# Patient Record
Sex: FEMALE | Race: WHITE
[De-identification: names, ages, dates, MRNs, and addresses within clinical notes are randomized per-mention and may not be internally consistent; named-entity substitution may affect disease eponyms.]

---

## 2019-07-01 ENCOUNTER — HOSPITAL ENCOUNTER (EMERGENCY)
Dept: HOSPITAL 47 - EC | Age: 49
LOS: 1 days | Discharge: PSYCHIATRIC OR SUBSTANCE USE FACILITY | End: 2019-07-02
Payer: COMMERCIAL

## 2019-07-01 VITALS — TEMPERATURE: 98.5 F

## 2019-07-01 DIAGNOSIS — F32.9: Primary | ICD-10-CM

## 2019-07-01 DIAGNOSIS — F17.200: ICD-10-CM

## 2019-07-01 DIAGNOSIS — Z91.048: ICD-10-CM

## 2019-07-01 DIAGNOSIS — R45.851: ICD-10-CM

## 2019-07-01 DIAGNOSIS — Z79.899: ICD-10-CM

## 2019-07-01 DIAGNOSIS — E11.9: ICD-10-CM

## 2019-07-01 DIAGNOSIS — Z79.84: ICD-10-CM

## 2019-07-01 DIAGNOSIS — Z88.2: ICD-10-CM

## 2019-07-01 DIAGNOSIS — F41.9: ICD-10-CM

## 2019-07-01 LAB
ALBUMIN SERPL-MCNC: 4 G/DL (ref 3.5–5)
ALP SERPL-CCNC: 80 U/L (ref 38–126)
ALT SERPL-CCNC: 13 U/L (ref 9–52)
ANION GAP SERPL CALC-SCNC: 5 MMOL/L
AST SERPL-CCNC: 17 U/L (ref 14–36)
BASOPHILS # BLD AUTO: 0.1 K/UL (ref 0–0.2)
BASOPHILS NFR BLD AUTO: 1 %
BUN SERPL-SCNC: 7 MG/DL (ref 7–17)
CALCIUM SPEC-MCNC: 8.9 MG/DL (ref 8.4–10.2)
CHLORIDE SERPL-SCNC: 104 MMOL/L (ref 98–107)
CO2 SERPL-SCNC: 28 MMOL/L (ref 22–30)
EOSINOPHIL # BLD AUTO: 0.3 K/UL (ref 0–0.7)
EOSINOPHIL NFR BLD AUTO: 4 %
ERYTHROCYTE [DISTWIDTH] IN BLOOD BY AUTOMATED COUNT: 4.52 M/UL (ref 3.8–5.4)
ERYTHROCYTE [DISTWIDTH] IN BLOOD: 14.7 % (ref 11.5–15.5)
GLUCOSE SERPL-MCNC: 67 MG/DL (ref 74–99)
HCT VFR BLD AUTO: 42 % (ref 34–46)
HGB BLD-MCNC: 14.1 GM/DL (ref 11.4–16)
LYMPHOCYTES # SPEC AUTO: 2.3 K/UL (ref 1–4.8)
LYMPHOCYTES NFR SPEC AUTO: 39 %
MCH RBC QN AUTO: 31.3 PG (ref 25–35)
MCHC RBC AUTO-ENTMCNC: 33.7 G/DL (ref 31–37)
MCV RBC AUTO: 92.9 FL (ref 80–100)
MONOCYTES # BLD AUTO: 0.3 K/UL (ref 0–1)
MONOCYTES NFR BLD AUTO: 5 %
NEUTROPHILS # BLD AUTO: 3 K/UL (ref 1.3–7.7)
NEUTROPHILS NFR BLD AUTO: 50 %
PH UR: 6 [PH] (ref 5–8)
PLATELET # BLD AUTO: 253 K/UL (ref 150–450)
POTASSIUM SERPL-SCNC: 4.3 MMOL/L (ref 3.5–5.1)
PROT SERPL-MCNC: 6.9 G/DL (ref 6.3–8.2)
RBC UR QL: 2 /HPF (ref 0–5)
SODIUM SERPL-SCNC: 137 MMOL/L (ref 137–145)
SP GR UR: 1.03 (ref 1–1.03)
SQUAMOUS UR QL AUTO: 7 /HPF (ref 0–4)
UROBILINOGEN UR QL STRIP: 2 MG/DL (ref ?–2)
WBC # BLD AUTO: 6.1 K/UL (ref 3.8–10.6)
WBC #/AREA URNS HPF: 2 /HPF (ref 0–5)

## 2019-07-01 PROCEDURE — 81001 URINALYSIS AUTO W/SCOPE: CPT

## 2019-07-01 PROCEDURE — 80053 COMPREHEN METABOLIC PANEL: CPT

## 2019-07-01 PROCEDURE — 36415 COLL VENOUS BLD VENIPUNCTURE: CPT

## 2019-07-01 PROCEDURE — 82075 ASSAY OF BREATH ETHANOL: CPT

## 2019-07-01 PROCEDURE — 85025 COMPLETE CBC W/AUTO DIFF WBC: CPT

## 2019-07-01 PROCEDURE — 80306 DRUG TEST PRSMV INSTRMNT: CPT

## 2019-07-01 PROCEDURE — 99285 EMERGENCY DEPT VISIT HI MDM: CPT

## 2019-07-01 PROCEDURE — 83036 HEMOGLOBIN GLYCOSYLATED A1C: CPT

## 2019-07-01 NOTE — ED
General Adult HPI





- General


Chief complaint: Psychiatric Symptoms


Stated complaint: mental health


Time Seen by Provider: 19 16:27


Source: patient, RN notes reviewed


Mode of arrival: ambulatory


Limitations: no limitations





- History of Present Illness


Initial comments: 





49-year-old female presents to the emergency department for a chief complaint of

depression.  Patient was diagnosed with PTSD 4-1/2 years ago when her  

 from a "blood clots".  States she watched him struggle to breathe or 40 

minutes before he .  Patient states she has struggled with PTSD and feelings

of guilt since that time.  States she is very depressed.  States that she would 

have killed herself when her   if it were not for her son's.  States 

it has been worsening lately.  Patient accidentally cut her hand last week and 

stated it felt good soap, several superficial lacerations along the palms of her

hand.  States she cut herself here so people would not be able to notice them.  

States over the weekend she has had multiple suicidal thoughts.  Denies plan at 

this time.  States she wishes she was dead so she could be with her . 

Patient has no other complaints at this time including shortness of breath, 

chest pain, abdominal pain, nausea or vomiting, headache, or visual changes.





- Related Data


                                Home Medications











 Medication  Instructions  Recorded  Confirmed


 


ARIPiprazole [Abilify] 2 mg PO HS 19


 


Cyclobenzaprine [Flexeril] 10 mg PO HS 19


 


Gabapentin 600 mg PO DAILY@1300 19


 


Gabapentin [Neurontin] 1,200 mg PO BID@0800,2100 19


 


Methadone [Dolophine] 10 mg PO BID@0800,2100 19


 


Methadone [Dolophine] 20 mg PO DAILY@1300 19


 


amLODIPine BESYLATE/BENAZEPRIL 1 cap PO DAILY 19





[Lotrel 10-40 MG]   


 


buPROPion HCL [Wellbutrin XL] 150 mg PO DAILY 19


 


buPROPion HCL [Wellbutrin XL] 300 mg PO DAILY 19


 


clonazePAM [KlonoPIN] 0.5 mg PO HS PRN 19


 


metFORMIN HCL [Glucophage] 500 mg PO DAILY 19


 


traZODone  mg PO HS 19











                                    Allergies











Allergy/AdvReac Type Severity Reaction Status Date / Time


 


adhesive Allergy  Unknown Verified 19 18:15


 


Sulfa (Sulfonamide Allergy  Unknown Verified 19 18:15





Antibiotics)     














Review of Systems


ROS Statement: 


Those systems with pertinent positive or pertinent negative responses have been 

documented in the HPI.





ROS Other: All systems not noted in ROS Statement are negative.





Past Medical History


Past Medical History: Diabetes Mellitus


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 

insomnia


History of Any Multi-Drug Resistant Organisms: None Reported


Past Surgical History: Hysterectomy, Orthopedic Surgery


Past Psychological History: Anxiety, Depression, PTSD


Smoking Status: Current every day smoker


Past Alcohol Use History: None Reported


Past Drug Use History: None Reported





General Exam


Limitations: no limitations


General appearance: other (Alert, upset)


Head exam: Present: atraumatic, normocephalic, normal inspection


Eye exam: Present: normal appearance, PERRL, EOMI.  Absent: scleral icterus, 

conjunctival injection, periorbital swelling


ENT exam: Present: normal exam, mucous membranes moist


Neck exam: Present: normal inspection, full ROM.  Absent: tenderness, 

meningismus, lymphadenopathy


Respiratory exam: Present: normal lung sounds bilaterally.  Absent: respiratory 

distress, wheezes, rales, rhonchi, stridor


Cardiovascular Exam: Present: regular rate, normal rhythm, normal heart sounds. 

 Absent: systolic murmur, diastolic murmur, rubs, gallop, clicks


Neurological exam: Present: alert, oriented X3, CN II-XII intact


Psychiatric exam: Present: normal affect, normal mood





Course


                                   Vital Signs











  19





  16:25


 


Temperature 98.5 F


 


Pulse Rate 97


 


Respiratory 18





Rate 


 


Blood Pressure 119/79


 


O2 Sat by Pulse 96





Oximetry 














Medical Decision Making





- Medical Decision Making





Patient evaluated by EPS, will be transferred to psychiatric facility elsewhere.





- Lab Data


                                   Lab Results











  19 Range/Units





  17:14 


 


Urine Opiates Screen  Not Detected  (NotDetected)  


 


Ur Oxycodone Screen  Not Detected  (NotDetected)  


 


Urine Methadone Screen  Detected H  (NotDetected)  


 


Ur Propoxyphene Screen  Not Detected  (NotDetected)  


 


Ur Barbiturates Screen  Not Detected  (NotDetected)  


 


U Tricyclic Antidepress  Detected H  (NotDetected)  


 


Ur Phencyclidine Scrn  Not Detected  (NotDetected)  


 


Ur Amphetamines Screen  Not Detected  (NotDetected)  


 


U Methamphetamines Scrn  Not Detected  (NotDetected)  


 


U Benzodiazepines Scrn  Not Detected  (NotDetected)  


 


Urine Cocaine Screen  Not Detected  (NotDetected)  


 


U Marijuana (THC) Screen  Not Detected  (NotDetected)  














Disposition


Clinical Impression: 


 Suicidal ideation, Depression





Disposition: TRANSFER TO PSYCH HOSP/UNIT


Condition: Fair


Referrals: 


Nargis Ellington DO [Primary Care Provider] - 1-2 days


Time of Disposition: 20:44

## 2019-07-02 VITALS — RESPIRATION RATE: 16 BRPM | DIASTOLIC BLOOD PRESSURE: 72 MMHG | HEART RATE: 73 BPM | SYSTOLIC BLOOD PRESSURE: 123 MMHG

## 2019-07-02 LAB — HBA1C MFR BLD: 5.4 % (ref 4–6)
